# Patient Record
Sex: MALE | Race: WHITE | Employment: FULL TIME | URBAN - METROPOLITAN AREA
[De-identification: names, ages, dates, MRNs, and addresses within clinical notes are randomized per-mention and may not be internally consistent; named-entity substitution may affect disease eponyms.]

---

## 2017-04-04 ENCOUNTER — OFFICE VISIT (OUTPATIENT)
Dept: INTERNAL MEDICINE CLINIC | Facility: CLINIC | Age: 42
End: 2017-04-04

## 2017-04-04 VITALS
DIASTOLIC BLOOD PRESSURE: 86 MMHG | SYSTOLIC BLOOD PRESSURE: 126 MMHG | OXYGEN SATURATION: 98 % | HEART RATE: 83 BPM | HEIGHT: 67.75 IN | BODY MASS INDEX: 25.82 KG/M2 | WEIGHT: 168.38 LBS | TEMPERATURE: 98 F

## 2017-04-04 DIAGNOSIS — Z00.00 ROUTINE HEALTH MAINTENANCE: ICD-10-CM

## 2017-04-04 DIAGNOSIS — E78.1 HYPERGLYCERIDEMIA: Primary | ICD-10-CM

## 2017-04-04 DIAGNOSIS — E55.9 VITAMIN D DEFICIENCY: ICD-10-CM

## 2017-04-04 PROCEDURE — 99395 PREV VISIT EST AGE 18-39: CPT | Performed by: INTERNAL MEDICINE

## 2017-04-04 NOTE — PROGRESS NOTES
Maralee Moritz is a 46year old malePatient presents with:  Physical: Feeling well - \"pushing 40\" concerned about wt gain. HPI:     Maralee Moritz is a 46year old male who presents for a complete physical exam.     Feels well.   Exercising reg developed, well nourished, in no apparent distress  HEENT: normal oropharynx, normal TM's  EYES: PERRLA, EOMI, conjunctivae pink  NECK: supple, no cervical or supraclavicular lymphadenopathy, no carotid bruits  LUNGS: clear to auscultation  CARDIO: RRR, no

## 2017-07-01 ENCOUNTER — APPOINTMENT (OUTPATIENT)
Dept: LAB | Age: 42
End: 2017-07-01
Attending: INTERNAL MEDICINE
Payer: COMMERCIAL

## 2017-07-01 PROCEDURE — 84443 ASSAY THYROID STIM HORMONE: CPT | Performed by: INTERNAL MEDICINE

## 2017-07-01 PROCEDURE — 85025 COMPLETE CBC W/AUTO DIFF WBC: CPT | Performed by: INTERNAL MEDICINE

## 2017-07-01 PROCEDURE — 80061 LIPID PANEL: CPT | Performed by: INTERNAL MEDICINE

## 2017-07-01 PROCEDURE — 84460 ALANINE AMINO (ALT) (SGPT): CPT | Performed by: INTERNAL MEDICINE

## 2017-07-01 PROCEDURE — 84450 TRANSFERASE (AST) (SGOT): CPT | Performed by: INTERNAL MEDICINE

## 2017-07-01 PROCEDURE — 80048 BASIC METABOLIC PNL TOTAL CA: CPT | Performed by: INTERNAL MEDICINE

## 2017-07-01 PROCEDURE — 82306 VITAMIN D 25 HYDROXY: CPT | Performed by: INTERNAL MEDICINE

## 2017-07-18 ENCOUNTER — TELEPHONE (OUTPATIENT)
Dept: INTERNAL MEDICINE CLINIC | Facility: CLINIC | Age: 42
End: 2017-07-18

## 2017-07-19 NOTE — TELEPHONE ENCOUNTER
Please call pt with labs -- his triglycerides are still quite high -- he should be taking fish oil 3g/day and following a healthy diet. His Vitamin D level is still quite low (14). Is he still taking the weekly vitamin D? If not, he should.

## 2017-07-25 NOTE — TELEPHONE ENCOUNTER
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

## 2017-10-03 RX ORDER — ERGOCALCIFEROL 1.25 MG/1
CAPSULE ORAL
Qty: 13 CAPSULE | Refills: 3 | Status: SHIPPED | OUTPATIENT
Start: 2017-10-03 | End: 2018-09-04

## 2018-05-30 ENCOUNTER — OFFICE VISIT (OUTPATIENT)
Dept: INTERNAL MEDICINE CLINIC | Facility: CLINIC | Age: 43
End: 2018-05-30

## 2018-05-30 VITALS
HEART RATE: 78 BPM | TEMPERATURE: 98 F | DIASTOLIC BLOOD PRESSURE: 75 MMHG | SYSTOLIC BLOOD PRESSURE: 119 MMHG | BODY MASS INDEX: 25.74 KG/M2 | OXYGEN SATURATION: 98 % | HEIGHT: 68.5 IN | WEIGHT: 171.81 LBS

## 2018-05-30 DIAGNOSIS — R53.83 OTHER FATIGUE: ICD-10-CM

## 2018-05-30 DIAGNOSIS — E78.1 HYPERGLYCERIDEMIA: ICD-10-CM

## 2018-05-30 DIAGNOSIS — Z12.5 PROSTATE CANCER SCREENING: ICD-10-CM

## 2018-05-30 DIAGNOSIS — E55.9 VITAMIN D DEFICIENCY: ICD-10-CM

## 2018-05-30 DIAGNOSIS — Z00.00 ROUTINE HEALTH MAINTENANCE: Primary | ICD-10-CM

## 2018-05-30 PROCEDURE — 99396 PREV VISIT EST AGE 40-64: CPT | Performed by: INTERNAL MEDICINE

## 2018-05-30 NOTE — PROGRESS NOTES
Brayden Barnes is a 38year old malePatient presents with:  Physical    HPI:     Brayden Barnes is a 38year old male who presents for a complete physical exam.     Feels well. No formal exercise, but stays active. Son is 6, starting first grade. BMI 25.74 kg/m²   GENERAL: well developed, well nourished, in no apparent distress  HEENT: normal oropharynx, normal TM's  EYES: PERRLA, EOMI, conjunctivae pink  NECK: supple, no cervical or supraclavicular lymphadenopathy, no carotid bruits  LUNGS: clear

## 2018-08-27 LAB
ABSOLUTE BASOPHILS: 52 CELLS/UL (ref 0–200)
ABSOLUTE EOSINOPHILS: 104 CELLS/UL (ref 15–500)
ABSOLUTE LYMPHOCYTES: 2242 CELLS/UL (ref 850–3900)
ABSOLUTE MONOCYTES: 451 CELLS/UL (ref 200–950)
ABSOLUTE NEUTROPHILS: 4551 CELLS/UL (ref 1500–7800)
ALBUMIN/GLOBULIN RATIO: 1.9 (CALC) (ref 1–2.5)
ALBUMIN: 4.6 G/DL (ref 3.6–5.1)
ALKALINE PHOSPHATASE: 62 U/L (ref 40–115)
ALT: 25 U/L (ref 9–46)
AST: 18 U/L (ref 10–40)
BASOPHILS: 0.7 %
BILIRUBIN, TOTAL: 1 MG/DL (ref 0.2–1.2)
BUN: 10 MG/DL (ref 7–25)
CALCIUM: 9.6 MG/DL (ref 8.6–10.3)
CARBON DIOXIDE: 29 MMOL/L (ref 20–32)
CHLORIDE: 104 MMOL/L (ref 98–110)
CHOL/HDLC RATIO: 5 (CALC)
CHOLESTEROL, TOTAL: 150 MG/DL
CREATININE: 0.77 MG/DL (ref 0.6–1.35)
EGFR IF AFRICN AM: 131 ML/MIN/1.73M2
EGFR IF NONAFRICN AM: 113 ML/MIN/1.73M2
EOSINOPHILS: 1.4 %
GLOBULIN: 2.4 G/DL (CALC) (ref 1.9–3.7)
GLUCOSE: 94 MG/DL (ref 65–99)
HDL CHOLESTEROL: 30 MG/DL
HEMATOCRIT: 46.7 % (ref 38.5–50)
HEMOGLOBIN: 16 G/DL (ref 13.2–17.1)
LDL-CHOLESTEROL: 82 MG/DL (CALC)
LYMPHOCYTES: 30.3 %
MCH: 31.1 PG (ref 27–33)
MCHC: 34.3 G/DL (ref 32–36)
MCV: 90.7 FL (ref 80–100)
MONOCYTES: 6.1 %
MPV: 10.8 FL (ref 7.5–12.5)
NEUTROPHILS: 61.5 %
NON-HDL CHOLESTEROL: 120 MG/DL (CALC)
PLATELET COUNT: 150 THOUSAND/UL (ref 140–400)
POTASSIUM: 4.1 MMOL/L (ref 3.5–5.3)
PROTEIN, TOTAL: 7 G/DL (ref 6.1–8.1)
PSA, TOTAL: 0.6 NG/ML
RDW: 12.8 % (ref 11–15)
RED BLOOD CELL COUNT: 5.15 MILLION/UL (ref 4.2–5.8)
SODIUM: 139 MMOL/L (ref 135–146)
TRIGLYCERIDES: 292 MG/DL
TSH: 2.51 MIU/L (ref 0.4–4.5)
VITAMIN D, 25-OH, TOTAL: 13 NG/ML (ref 30–100)
WHITE BLOOD CELL COUNT: 7.4 THOUSAND/UL (ref 3.8–10.8)

## 2018-09-04 RX ORDER — ERGOCALCIFEROL 1.25 MG/1
CAPSULE ORAL
Qty: 13 CAPSULE | Refills: 3 | Status: SHIPPED | OUTPATIENT
Start: 2018-09-04 | End: 2019-12-10

## 2018-12-13 ENCOUNTER — OFFICE VISIT (OUTPATIENT)
Dept: OTOLARYNGOLOGY | Facility: CLINIC | Age: 43
End: 2018-12-13
Payer: COMMERCIAL

## 2018-12-13 VITALS
WEIGHT: 165 LBS | SYSTOLIC BLOOD PRESSURE: 118 MMHG | TEMPERATURE: 98 F | BODY MASS INDEX: 25.01 KG/M2 | DIASTOLIC BLOOD PRESSURE: 80 MMHG | HEIGHT: 68 IN

## 2018-12-13 DIAGNOSIS — R04.0 NOSEBLEED: Primary | ICD-10-CM

## 2018-12-13 PROCEDURE — 30901 CONTROL OF NOSEBLEED: CPT | Performed by: OTOLARYNGOLOGY

## 2018-12-13 PROCEDURE — 99212 OFFICE O/P EST SF 10 MIN: CPT | Performed by: OTOLARYNGOLOGY

## 2018-12-13 PROCEDURE — 99214 OFFICE O/P EST MOD 30 MIN: CPT | Performed by: OTOLARYNGOLOGY

## 2018-12-13 NOTE — PROGRESS NOTES
Maximiliano Freitas is a 41year old male. Patient presents with:  Epistaxis: nose bleed of both nostrils started saturday      HISTORY OF PRESENT ILLNESS  Lifelong history of chronic severe nosebleeds bilaterally.  I saw him a little more than a month ago Alcohol/week: 0.0 oz        Comment: 3-6 drinks/year      Drug use: No    Other Topics      Concerns:        Caffeine Concern: No          32 oz of soda      Family History   Problem Relation Age of Onset   • Gastro-Intestinal Disorder Mother         chron Normal. Oropharynx - Normal.   Ears Normal Inspection - Right: Normal, Left: Normal. Canal - Right: Normal, Left: Normal. TM - Right: Normal, Left: Normal.   Skin Normal Inspection - Normal.        Lymph Detail Normal Submental. Submandibular.  Anterior cer

## 2019-06-18 ENCOUNTER — APPOINTMENT (OUTPATIENT)
Dept: OTHER | Facility: HOSPITAL | Age: 44
End: 2019-06-18
Attending: ORTHOPAEDIC SURGERY

## 2019-09-27 ENCOUNTER — HOSPITAL ENCOUNTER (EMERGENCY)
Facility: HOSPITAL | Age: 44
Discharge: HOME | End: 2019-09-28
Attending: EMERGENCY MEDICINE
Payer: COMMERCIAL

## 2019-09-27 VITALS
HEART RATE: 82 BPM | BODY MASS INDEX: 25.01 KG/M2 | TEMPERATURE: 98 F | RESPIRATION RATE: 18 BRPM | SYSTOLIC BLOOD PRESSURE: 144 MMHG | OXYGEN SATURATION: 96 % | DIASTOLIC BLOOD PRESSURE: 80 MMHG | WEIGHT: 165 LBS | HEIGHT: 68 IN

## 2019-09-27 DIAGNOSIS — R04.0 EPISTAXIS: Primary | ICD-10-CM

## 2019-09-27 PROCEDURE — 99283 EMERGENCY DEPT VISIT LOW MDM: CPT

## 2019-09-27 PROCEDURE — 30903 CONTROL OF NOSEBLEED: CPT

## 2019-09-27 RX ORDER — TRANEXAMIC ACID 100 MG/ML
5 INJECTION, SOLUTION INTRAVENOUS ONCE
Status: COMPLETED | OUTPATIENT
Start: 2019-09-27 | End: 2019-09-27

## 2019-09-28 NOTE — ED INITIAL ASSESSMENT (HPI)
Pt reports spontaneous R and L nare nosebleed x3 hours. Denies thinner use.  Also reports lightheadedness

## 2019-09-28 NOTE — ED PROVIDER NOTES
Patient Seen in: [facility] AND [facility] Emergency Department    History   Patient presents with:  Nose Bleed (nasopharyngeal)      HPI    Patient presents to the ED with  significant bleeding from his right nostril for the past 3 hours.   Occasional mild bleed well-developed and well-nourished. No distress. HENT:   Head: Normocephalic and atraumatic. Ongoing bleeding from the right nare. Unable to visualize source of bleeding. Eyes: Conjunctivae are normal. Right eye exhibits no discharge.  Left eye exhibi packing placed and bleeding resolved completely. Stable for discharge home with outpatient ENT follow-up. Procedure:  Epistaxis Management:  Bleeding noted from the Right nare. Clots cleared with  Blowing. Vasoconstrictor applied, Santosh-Synephrine.  Bleed

## 2019-10-03 ENCOUNTER — OFFICE VISIT (OUTPATIENT)
Dept: OTOLARYNGOLOGY | Facility: CLINIC | Age: 44
End: 2019-10-03
Payer: COMMERCIAL

## 2019-10-03 VITALS
WEIGHT: 165 LBS | HEIGHT: 68 IN | TEMPERATURE: 98 F | DIASTOLIC BLOOD PRESSURE: 76 MMHG | SYSTOLIC BLOOD PRESSURE: 115 MMHG | BODY MASS INDEX: 25.01 KG/M2

## 2019-10-03 DIAGNOSIS — R04.0 NOSEBLEED: Primary | ICD-10-CM

## 2019-10-03 PROCEDURE — 99214 OFFICE O/P EST MOD 30 MIN: CPT | Performed by: OTOLARYNGOLOGY

## 2019-10-03 NOTE — PROGRESS NOTES
Maximiliano Freitas is a 45year old male. Patient presents with:  Epistaxis: nose bleed of both nostrils      HISTORY OF PRESENT ILLNESS  Lifelong history of chronic severe nosebleeds bilaterally.  I saw him a little more than a month ago and we cauterized Number of children: Not on file      Years of education: Not on file      Highest education level: Not on file    Tobacco Use      Smoking status: Never Smoker      Smokeless tobacco: Never Used    Substance and Sexual Activity      Alcohol use:  Yes Neurological Normal Memory - Normal. Cranial nerves - Cranial nerves II through XII grossly intact.    Head/Face Normal Facial features - Normal. Eyebrows - Normal. Skull - Normal.        Nasopharynx Normal External nose - Normal. Lips/teeth/gums - Normal we will start him on Singulair loratadine for what appears to be some congestive issues from allergies. Kalin Valerio.  Yaima Brand MD    10/3/2019    3:55 PM

## 2019-10-22 ENCOUNTER — OFFICE VISIT (OUTPATIENT)
Dept: INTERNAL MEDICINE CLINIC | Facility: CLINIC | Age: 44
End: 2019-10-22
Payer: COMMERCIAL

## 2019-10-22 VITALS
BODY MASS INDEX: 25.25 KG/M2 | HEIGHT: 68 IN | TEMPERATURE: 98 F | SYSTOLIC BLOOD PRESSURE: 98 MMHG | HEART RATE: 69 BPM | WEIGHT: 166.63 LBS | DIASTOLIC BLOOD PRESSURE: 68 MMHG | OXYGEN SATURATION: 98 %

## 2019-10-22 DIAGNOSIS — E78.1 HYPERGLYCERIDEMIA: ICD-10-CM

## 2019-10-22 DIAGNOSIS — E55.9 VITAMIN D DEFICIENCY: ICD-10-CM

## 2019-10-22 DIAGNOSIS — R53.83 OTHER FATIGUE: ICD-10-CM

## 2019-10-22 DIAGNOSIS — Z00.00 ROUTINE HEALTH MAINTENANCE: Primary | ICD-10-CM

## 2019-10-22 DIAGNOSIS — Z12.5 PROSTATE CANCER SCREENING: ICD-10-CM

## 2019-10-22 PROCEDURE — 90471 IMMUNIZATION ADMIN: CPT | Performed by: INTERNAL MEDICINE

## 2019-10-22 PROCEDURE — 90686 IIV4 VACC NO PRSV 0.5 ML IM: CPT | Performed by: INTERNAL MEDICINE

## 2019-10-22 PROCEDURE — 99396 PREV VISIT EST AGE 40-64: CPT | Performed by: INTERNAL MEDICINE

## 2019-10-22 NOTE — PROGRESS NOTES
Inocencio Khan is a 45year old malePatient presents with:  Physical: Pt no longer taking Fish Oil or Vitamin D, lab orders pended per protocol.      HPI:     Inocencio Khan is a 45year old male who presents for a complete physical exam.     Aime Stacy 69   Temp 98.2 °F (36.8 °C) (Oral)   Ht 5' 8\" (1.727 m)   Wt 166 lb 9.6 oz (75.6 kg)   SpO2 98%   BMI 25.33 kg/m²   GENERAL: well developed, well nourished, in no apparent distress  HEENT: normal oropharynx, normal TM's  EYES: PERRLA, EOMI, conjunctivae p

## 2019-12-10 RX ORDER — MULTIVIT-MIN/IRON/FOLIC ACID/K 18-600-40
4000 CAPSULE ORAL DAILY
Qty: 1 CAPSULE | Refills: 0 | COMMUNITY
Start: 2019-12-10 | End: 2019-12-18

## 2019-12-20 RX ORDER — MULTIVIT-MIN/IRON/FOLIC ACID/K 18-600-40
4000 CAPSULE ORAL DAILY
Qty: 180 CAPSULE | Refills: 3 | Status: SHIPPED | OUTPATIENT
Start: 2019-12-20 | End: 2020-12-10

## 2020-10-22 ENCOUNTER — OFFICE VISIT (OUTPATIENT)
Dept: INTERNAL MEDICINE CLINIC | Facility: CLINIC | Age: 45
End: 2020-10-22
Payer: COMMERCIAL

## 2020-10-22 VITALS
BODY MASS INDEX: 25.25 KG/M2 | OXYGEN SATURATION: 99 % | DIASTOLIC BLOOD PRESSURE: 80 MMHG | SYSTOLIC BLOOD PRESSURE: 112 MMHG | WEIGHT: 166.63 LBS | TEMPERATURE: 99 F | HEART RATE: 78 BPM | HEIGHT: 68 IN

## 2020-10-22 DIAGNOSIS — E55.9 VITAMIN D DEFICIENCY: ICD-10-CM

## 2020-10-22 DIAGNOSIS — R53.83 OTHER FATIGUE: ICD-10-CM

## 2020-10-22 DIAGNOSIS — Z00.00 ROUTINE HEALTH MAINTENANCE: Primary | ICD-10-CM

## 2020-10-22 DIAGNOSIS — Z12.5 SCREENING FOR PROSTATE CANCER: ICD-10-CM

## 2020-10-22 PROCEDURE — 3008F BODY MASS INDEX DOCD: CPT | Performed by: INTERNAL MEDICINE

## 2020-10-22 PROCEDURE — 90471 IMMUNIZATION ADMIN: CPT | Performed by: INTERNAL MEDICINE

## 2020-10-22 PROCEDURE — 3074F SYST BP LT 130 MM HG: CPT | Performed by: INTERNAL MEDICINE

## 2020-10-22 PROCEDURE — 90686 IIV4 VACC NO PRSV 0.5 ML IM: CPT | Performed by: INTERNAL MEDICINE

## 2020-10-22 PROCEDURE — 99396 PREV VISIT EST AGE 40-64: CPT | Performed by: INTERNAL MEDICINE

## 2020-10-22 PROCEDURE — 3079F DIAST BP 80-89 MM HG: CPT | Performed by: INTERNAL MEDICINE

## 2020-10-22 RX ORDER — AMOXICILLIN 500 MG
3600 CAPSULE ORAL DAILY
Qty: 1 CAPSULE | Refills: 0 | COMMUNITY
Start: 2020-10-22

## 2020-10-22 NOTE — PROGRESS NOTES
Tad Reich is a 39year old malePatient presents with:  Physical    HPI:     Tad Reich is a 39year old male who presents for a complete physical exam.     Still working in [email or web address] ; moved to a new house in [place].   Son (Oral)   Ht 5' 8\" (1.727 m)   Wt 166 lb 9.6 oz (75.6 kg)   SpO2 99%   BMI 25.33 kg/m²   GENERAL: well developed, well nourished, in no apparent distress  HEENT: normal oropharynx, normal TM's  EYES: PERRLA, EOMI, conjunctivae pink  NECK: supple, no cervic

## 2020-12-08 ENCOUNTER — HOSPITAL ENCOUNTER (OUTPATIENT)
Age: 45
Discharge: HOME | End: 2020-12-08
Payer: COMMERCIAL

## 2020-12-08 VITALS
BODY MASS INDEX: 25 KG/M2 | DIASTOLIC BLOOD PRESSURE: 82 MMHG | OXYGEN SATURATION: 96 % | WEIGHT: 165 LBS | RESPIRATION RATE: 16 BRPM | HEART RATE: 94 BPM | SYSTOLIC BLOOD PRESSURE: 151 MMHG | TEMPERATURE: 98 F

## 2020-12-08 DIAGNOSIS — J02.0 STREP PHARYNGITIS: Primary | ICD-10-CM

## 2020-12-08 DIAGNOSIS — Z20.822 ENCOUNTER FOR LABORATORY TESTING FOR COVID-19 VIRUS: ICD-10-CM

## 2020-12-08 PROCEDURE — 87430 STREP A AG IA: CPT

## 2020-12-08 PROCEDURE — 99213 OFFICE O/P EST LOW 20 MIN: CPT

## 2020-12-08 PROCEDURE — 99214 OFFICE O/P EST MOD 30 MIN: CPT

## 2020-12-08 RX ORDER — AMOXICILLIN 500 MG/1
500 TABLET, FILM COATED ORAL 2 TIMES DAILY
Qty: 14 TABLET | Refills: 0 | Status: SHIPPED | OUTPATIENT
Start: 2020-12-08 | End: 2020-12-15

## 2020-12-08 NOTE — ED PROVIDER NOTES
Patient Seen in: [facility]      History   Patient presents with:  Sore Throat    Stated Complaint: Slight sore throat, cough, no exposure    HPI    40 yo male here for evaluation of sore throat. Pt states symptoms started <24 hrs ago.   He r Neck:      Musculoskeletal: Normal range of motion. Cardiovascular:      Rate and Rhythm: Normal rate. Pulmonary:      Effort: Pulmonary effort is normal.   Abdominal:      General: Abdomen is flat. Musculoskeletal: Normal range of motion.    Skin:

## 2020-12-09 ENCOUNTER — TELEPHONE (OUTPATIENT)
Dept: INTERNAL MEDICINE CLINIC | Facility: CLINIC | Age: 45
End: 2020-12-09

## 2020-12-09 NOTE — TELEPHONE ENCOUNTER
To Dr. Acosta Boswell----    Ok to advise patient to await COVID results prior to return to work instructions?

## 2020-12-09 NOTE — TELEPHONE ENCOUNTER
Patient diagnosed w/strep at [facility] think its covid - but he was tested and results are pending  Requests call back to advise what steps he needs to do for going back to work    Call back # [phone]

## 2021-01-23 ENCOUNTER — PATIENT MESSAGE (OUTPATIENT)
Dept: INTERNAL MEDICINE CLINIC | Facility: CLINIC | Age: 46
End: 2021-01-23

## 2021-01-26 NOTE — TELEPHONE ENCOUNTER
From: Canelo Hutson  To: Yue Scott MD  Sent: 1/23/2021 2:25 PM CST  Subject: Non-Urgent Medical Question    Hello,    I don't think I've ever had my testosterone levels checked. I've looked through my charts, and don't see any results.  How can

## 2021-11-18 ENCOUNTER — OFFICE VISIT (OUTPATIENT)
Dept: INTERNAL MEDICINE CLINIC | Facility: CLINIC | Age: 46
End: 2021-11-18
Payer: COMMERCIAL

## 2021-11-18 VITALS
OXYGEN SATURATION: 98 % | WEIGHT: 168 LBS | HEIGHT: 68 IN | TEMPERATURE: 97 F | SYSTOLIC BLOOD PRESSURE: 118 MMHG | HEART RATE: 83 BPM | DIASTOLIC BLOOD PRESSURE: 74 MMHG | BODY MASS INDEX: 25.46 KG/M2

## 2021-11-18 DIAGNOSIS — E55.9 VITAMIN D DEFICIENCY: ICD-10-CM

## 2021-11-18 DIAGNOSIS — Z12.5 SCREENING FOR PROSTATE CANCER: ICD-10-CM

## 2021-11-18 DIAGNOSIS — Z00.00 ROUTINE HEALTH MAINTENANCE: ICD-10-CM

## 2021-11-18 DIAGNOSIS — Z23 NEED FOR VACCINATION: Primary | ICD-10-CM

## 2021-11-18 PROCEDURE — 90471 IMMUNIZATION ADMIN: CPT | Performed by: INTERNAL MEDICINE

## 2021-11-18 PROCEDURE — 3074F SYST BP LT 130 MM HG: CPT | Performed by: INTERNAL MEDICINE

## 2021-11-18 PROCEDURE — 99396 PREV VISIT EST AGE 40-64: CPT | Performed by: INTERNAL MEDICINE

## 2021-11-18 PROCEDURE — 90686 IIV4 VACC NO PRSV 0.5 ML IM: CPT | Performed by: INTERNAL MEDICINE

## 2021-11-18 PROCEDURE — 3008F BODY MASS INDEX DOCD: CPT | Performed by: INTERNAL MEDICINE

## 2021-11-18 PROCEDURE — 3078F DIAST BP <80 MM HG: CPT | Performed by: INTERNAL MEDICINE

## 2021-11-18 NOTE — PROGRESS NOTES
Nancy Walker is a 42year old malePatient presents with:  Physical: pt here for annual exam     HPI:     Nancy Walker is a 42year old male who presents for a complete physical exam.     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

## 2022-05-27 ENCOUNTER — HOSPITAL ENCOUNTER (OUTPATIENT)
Age: 47
Discharge: HOME | End: 2022-05-27
Attending: EMERGENCY MEDICINE
Payer: COMMERCIAL

## 2022-05-27 VITALS
HEART RATE: 85 BPM | TEMPERATURE: 97 F | DIASTOLIC BLOOD PRESSURE: 72 MMHG | RESPIRATION RATE: 20 BRPM | OXYGEN SATURATION: 97 % | SYSTOLIC BLOOD PRESSURE: 135 MMHG

## 2022-05-27 DIAGNOSIS — J02.9 PHARYNGITIS, UNSPECIFIED ETIOLOGY: Primary | ICD-10-CM

## 2022-05-27 LAB — S PYO AG THROAT QL: NEGATIVE

## 2022-05-27 PROCEDURE — 99213 OFFICE O/P EST LOW 20 MIN: CPT

## 2022-05-27 PROCEDURE — 99212 OFFICE O/P EST SF 10 MIN: CPT

## 2022-05-27 PROCEDURE — 87880 STREP A ASSAY W/OPTIC: CPT

## 2022-12-01 ENCOUNTER — OFFICE VISIT (OUTPATIENT)
Dept: INTERNAL MEDICINE CLINIC | Facility: CLINIC | Age: 47
End: 2022-12-01
Payer: COMMERCIAL

## 2022-12-01 VITALS
SYSTOLIC BLOOD PRESSURE: 108 MMHG | TEMPERATURE: 99 F | DIASTOLIC BLOOD PRESSURE: 68 MMHG | WEIGHT: 164 LBS | HEART RATE: 76 BPM | HEIGHT: 68 IN | BODY MASS INDEX: 24.86 KG/M2 | OXYGEN SATURATION: 98 %

## 2022-12-01 DIAGNOSIS — Z00.00 ROUTINE HEALTH MAINTENANCE: Primary | ICD-10-CM

## 2022-12-01 DIAGNOSIS — Z12.5 SCREENING FOR PROSTATE CANCER: ICD-10-CM

## 2022-12-01 DIAGNOSIS — E78.1 HYPERTRIGLYCERIDEMIA: ICD-10-CM

## 2022-12-01 DIAGNOSIS — E55.9 VITAMIN D DEFICIENCY: ICD-10-CM

## 2022-12-01 PROCEDURE — 3008F BODY MASS INDEX DOCD: CPT | Performed by: INTERNAL MEDICINE

## 2022-12-01 PROCEDURE — 99396 PREV VISIT EST AGE 40-64: CPT | Performed by: INTERNAL MEDICINE

## 2022-12-01 PROCEDURE — 3074F SYST BP LT 130 MM HG: CPT | Performed by: INTERNAL MEDICINE

## 2022-12-01 PROCEDURE — 3078F DIAST BP <80 MM HG: CPT | Performed by: INTERNAL MEDICINE

## 2022-12-18 LAB
ABSOLUTE BASOPHILS: 30 CELLS/UL (ref 0–200)
ABSOLUTE EOSINOPHILS: 78 CELLS/UL (ref 15–500)
ABSOLUTE LYMPHOCYTES: 1824 CELLS/UL (ref 850–3900)
ABSOLUTE MONOCYTES: 432 CELLS/UL (ref 200–950)
ABSOLUTE NEUTROPHILS: 3636 CELLS/UL (ref 1500–7800)
ALBUMIN/GLOBULIN RATIO: 2.3 (CALC) (ref 1–2.5)
ALBUMIN: 4.5 G/DL (ref 3.6–5.1)
ALKALINE PHOSPHATASE: 46 U/L (ref 36–130)
ALT: 15 U/L (ref 9–46)
AST: 14 U/L (ref 10–40)
BASOPHILS: 0.5 %
BILIRUBIN, TOTAL: 1.2 MG/DL (ref 0.2–1.2)
BUN: 12 MG/DL (ref 7–25)
CALCIUM: 9.2 MG/DL (ref 8.6–10.3)
CARBON DIOXIDE: 30 MMOL/L (ref 20–32)
CHLORIDE: 105 MMOL/L (ref 98–110)
CHOL/HDLC RATIO: 3.7 (CALC)
CHOLESTEROL, TOTAL: 133 MG/DL
CREATININE: 0.76 MG/DL (ref 0.6–1.29)
EGFR: 113 ML/MIN/1.73M2
EOSINOPHILS: 1.3 %
GLOBULIN: 2 G/DL (CALC) (ref 1.9–3.7)
GLUCOSE: 94 MG/DL (ref 65–99)
HDL CHOLESTEROL: 36 MG/DL
HEMATOCRIT: 46.2 % (ref 38.5–50)
HEMOGLOBIN: 15.8 G/DL (ref 13.2–17.1)
LDL-CHOLESTEROL: 78 MG/DL (CALC)
LYMPHOCYTES: 30.4 %
MCH: 31.6 PG (ref 27–33)
MCHC: 34.2 G/DL (ref 32–36)
MCV: 92.4 FL (ref 80–100)
MONOCYTES: 7.2 %
MPV: 10.6 FL (ref 7.5–12.5)
NEUTROPHILS: 60.6 %
NON-HDL CHOLESTEROL: 97 MG/DL (CALC)
PLATELET COUNT: 155 THOUSAND/UL (ref 140–400)
POTASSIUM: 4.1 MMOL/L (ref 3.5–5.3)
PROTEIN, TOTAL: 6.5 G/DL (ref 6.1–8.1)
PSA, TOTAL: 0.79 NG/ML
RDW: 12.5 % (ref 11–15)
RED BLOOD CELL COUNT: 5 MILLION/UL (ref 4.2–5.8)
SODIUM: 140 MMOL/L (ref 135–146)
TRIGLYCERIDES: 103 MG/DL
TSH: 1.36 MIU/L (ref 0.4–4.5)
VITAMIN D, 25-OH, TOTAL: 50 NG/ML (ref 30–100)
WHITE BLOOD CELL COUNT: 6 THOUSAND/UL (ref 3.8–10.8)

## 2024-01-16 ENCOUNTER — OFFICE VISIT (OUTPATIENT)
Dept: OTOLARYNGOLOGY | Facility: CLINIC | Age: 49
End: 2024-01-16
Payer: COMMERCIAL

## 2024-01-16 VITALS — BODY MASS INDEX: 24 KG/M2 | WEIGHT: 160 LBS

## 2024-01-16 DIAGNOSIS — R04.0 NOSEBLEED: Primary | ICD-10-CM

## 2024-01-16 PROCEDURE — 99203 OFFICE O/P NEW LOW 30 MIN: CPT | Performed by: OTOLARYNGOLOGY

## 2024-01-16 PROCEDURE — 30901 CONTROL OF NOSEBLEED: CPT | Performed by: OTOLARYNGOLOGY

## 2024-01-16 RX ORDER — CEPHALEXIN 500 MG/1
500 CAPSULE ORAL EVERY 8 HOURS
Qty: 21 CAPSULE | Refills: 0 | Status: SHIPPED | OUTPATIENT
Start: 2024-01-16

## 2024-01-16 NOTE — PROGRESS NOTES
Georgi Mai is a 48 year old male.    Chief Complaint   Patient presents with    Nose Problem     Nosebleeds, last episode was 01/13/24       HISTORY OF PRESENT ILLNESS  Lifelong history of chronic severe nosebleeds bilaterally. I saw him a little more than a month ago and we cauterized the vessels on the right. Since then he's only had a few episodes of spotting and no further bleeding from the right. He has been using saline gels, Vaseline and a humidifier with overall improvement in his bleeding on the left as well. He did have a pretty severe nosebleed last night at about an hour to stop which she controlled with pinching and use of a paper towel. No known personal or family history of eating disorder. She is here to discuss cauterization of his left-sided vessels as well.   8/25/16 Last seen in 2014 for epistaxis. Vessels cauterized bilaterally and no bleeding for over one year. Now with brisk bleeds several times a day for the past few months. No recent nasal trauma. No other new signs or symptoms.      12/8/16 Back in 2014 when we cauterized both sides he did well for almost 2 years. Since I cauterized his right vessels in August he's had no further right-sided bleeding but does continue to have left-sided bleeding frequently. He's had at least 5 episodes since September which often will last up to half an hour. No family or personal history of bleeding disorder. No other signs, symptoms or complaints.     12/13/18 he did well up until Saturday when he started having some nosebleeds primarily from the right side.  He is here to have his vessels cauterized.   10/3/19 doing well up until a week or 2 ago when he started having some nosebleeds.  Friday night severe nosebleed on the right and up in the ER had a pack placed which she removed on Sunday.  Now here for further evaluation and treatment.  No underlying issues for bleeding.     1/16/24 Saw him in 2019.  He did well up until a few months ago when  he started having bilateral nosebleeds right greater than left.  He is not sure if this is in any way related to the use of forced heat in the household.  A few years ago had a central humidifier placed but he lives in a 3 level home and is not sure if his bedroom is sufficiently humidified.  Does not have a humidifier in the room.  No other new signs, symptoms or complaints.    Social History     Socioeconomic History    Marital status:    Tobacco Use    Smoking status: Never    Smokeless tobacco: Never   Substance and Sexual Activity    Alcohol use: Yes     Comment: 3-6 drinks/year    Drug use: No   Other Topics Concern    Caffeine Concern No     Comment: 32 oz of soda       Family History   Problem Relation Age of Onset    Gastro-Intestinal Disorder Mother         chrons disease    Cancer Paternal Grandmother     Diabetes Paternal Grandfather        Past Medical History:   Diagnosis Date    Hypertriglyceridemia        Past Surgical History:   Procedure Laterality Date    OTHER SURGICAL HISTORY  11/2014    Nasal Cauterization         REVIEW OF SYSTEMS    System Neg/Pos Details   Constitutional Negative Fatigue, fever and weight loss.   ENMT Negative Drooling.   Eyes Negative Blurred vision and vision changes.   Respiratory Negative Dyspnea and wheezing.   Cardio Negative Chest pain, irregular heartbeat/palpitations and syncope.   GI Negative Abdominal pain and diarrhea.   Endocrine Negative Cold intolerance and heat intolerance.   Neuro Negative Tremors.   Psych Negative Anxiety and depression.   Integumentary Negative Frequent skin infections, pigment change and rash.   Hema/Lymph Negative Easy bleeding and easy bruising.           PHYSICAL EXAM    Wt 160 lb (72.6 kg)   BMI 24.33 kg/m²        Constitutional Normal Overall appearance - Normal.   Psychiatric Normal Orientation - Oriented to time, place, person & situation. Appropriate mood and affect.   Neck Exam Normal Inspection - Normal. Palpation -  Normal. Parotid gland - Normal. Thyroid gland - Normal.   Eyes Normal Conjunctiva - Right: Normal, Left: Normal. Pupil - Right: Normal, Left: Normal. Fundus - Right: Normal, Left: Normal.   Neurological Normal Memory - Normal. Cranial nerves - Cranial nerves II through XII grossly intact.   Head/Face Normal Facial features - Normal. Eyebrows - Normal. Skull - Normal.        Nasopharynx Normal External nose - Normal. Lips/teeth/gums - Normal. Tonsils - Normal. Oropharynx - Normal.   Ears Normal Inspection - Right: Normal, Left: Normal. Canal - Right: Normal, Left: Normal. TM - Right: Normal, Left: Normal.   Skin Normal Inspection - Normal.        Lymph Detail Normal Submental. Submandibular. Anterior cervical. Posterior cervical. Supraclavicular.        Nose/Mouth/Throat Normal External nose - Normal. Lips/teeth/gums - Normal. Tonsils - Normal. Oropharynx - Normal.   Nose/Mouth/Throat Normal Nares - Right: Normal Left: Normal. Septum -Normal  Turbinates - Right: Normal, Left: Normal.  Bilateral septal scabbing significant anterior nasal crusting bilaterally hypertrophic vessels bilaterally right greater than left   Procedures:  Control Epistaxis:  Pre-Procedure Care: Consent was obtained. Procedure/Risks were explained. Questions were answered. Correct patient identified. Correct side and site confirmed.   Control of a simple anterior nosebleed was performed. Location of the bleed was Kiesselbach's plexus. The procedure was performed on the right side.  Bleeding site/vessels were treated with AgNO3 cauterization.  Anesthesia used was topical Lidocaine/epinephrine 4%/1:71604   Patient tolerated the procedure well. Discharge instructions were discussed with the patient/parent. Epistaxis precautions were explained and understood. Use Vaseline and/or nasal saline gel to the affected area TID.       Current Outpatient Medications:     Omega-3 Fatty Acids (FISH OIL) 1200 MG Oral Cap, Take 3,600 mg by mouth daily., Disp: 1  capsule, Rfl: 0  ASSESSMENT AND PLAN    1. Nosebleed  Right septal vessels cauterized with silver nitrate.  Start mupirocin and cephalexin for a slight infection noted of the alar skin and for the significant crusting noted.  Epistaxis precautions discussed and use of Vaseline after he finishes with mupirocin return to see me in 1 month for reevaluation and we may address the left vessels if he continues to have bleeding on that side.        This note was prepared using Dragon Medical voice recognition dictation software. As a result errors may occur. When identified these errors have been corrected. While every attempt is made to correct errors during dictation discrepancies may still exist    Tyrone Kuo MD    1/16/2024    7:43 AM

## 2024-02-15 ENCOUNTER — TELEPHONE (OUTPATIENT)
Dept: INTERNAL MEDICINE CLINIC | Facility: CLINIC | Age: 49
End: 2024-02-15

## 2024-02-15 ENCOUNTER — OFFICE VISIT (OUTPATIENT)
Dept: INTERNAL MEDICINE CLINIC | Facility: CLINIC | Age: 49
End: 2024-02-15
Payer: COMMERCIAL

## 2024-02-15 VITALS
SYSTOLIC BLOOD PRESSURE: 110 MMHG | DIASTOLIC BLOOD PRESSURE: 78 MMHG | BODY MASS INDEX: 25.16 KG/M2 | OXYGEN SATURATION: 96 % | HEART RATE: 75 BPM | WEIGHT: 166 LBS | HEIGHT: 68 IN | TEMPERATURE: 98 F

## 2024-02-15 DIAGNOSIS — Z12.5 SCREENING FOR PROSTATE CANCER: ICD-10-CM

## 2024-02-15 DIAGNOSIS — H61.23 BILATERAL IMPACTED CERUMEN: ICD-10-CM

## 2024-02-15 DIAGNOSIS — Z00.00 ROUTINE HEALTH MAINTENANCE: Primary | ICD-10-CM

## 2024-02-15 DIAGNOSIS — E55.9 VITAMIN D DEFICIENCY: ICD-10-CM

## 2024-02-15 PROBLEM — E78.1 HYPERTRIGLYCERIDEMIA: Status: RESOLVED | Noted: 2022-12-01 | Resolved: 2024-02-15

## 2024-02-15 PROCEDURE — 90471 IMMUNIZATION ADMIN: CPT | Performed by: INTERNAL MEDICINE

## 2024-02-15 PROCEDURE — 3008F BODY MASS INDEX DOCD: CPT | Performed by: INTERNAL MEDICINE

## 2024-02-15 PROCEDURE — 90715 TDAP VACCINE 7 YRS/> IM: CPT | Performed by: INTERNAL MEDICINE

## 2024-02-15 PROCEDURE — 3074F SYST BP LT 130 MM HG: CPT | Performed by: INTERNAL MEDICINE

## 2024-02-15 PROCEDURE — 3078F DIAST BP <80 MM HG: CPT | Performed by: INTERNAL MEDICINE

## 2024-02-15 PROCEDURE — 99396 PREV VISIT EST AGE 40-64: CPT | Performed by: INTERNAL MEDICINE

## 2024-02-15 NOTE — PROGRESS NOTES
Georgi Mai is a 48 year old male  Chief Complaint   Patient presents with    Physical     Right knee \"cracking\", no other concerns      HPI:     Georgi Mai is a 48 year old male who presents for a complete physical exam.       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  Feels he is in the best shape of his life     Wt Readings from Last 6 Encounters:   02/15/24 166 lb (75.3 kg)   01/16/24 160 lb (72.6 kg)   12/01/22 164 lb (74.4 kg)   11/18/21 168 lb (76.2 kg)   12/08/20 165 lb (74.8 kg)   10/22/20 166 lb 9.6 oz (75.6 kg)     Body mass index is 25.24 kg/m².       Current Outpatient Medications   Medication Sig Dispense Refill    mupirocin 2 % External Ointment Apply 1 Application topically 3 (three) times daily. 1 each 0    Omega-3 Fatty Acids (FISH OIL) 1200 MG Oral Cap Take 3,600 mg by mouth daily. 1 capsule 0      Past Medical History:   Diagnosis Date    Hypertriglyceridemia       Past Surgical History:   Procedure Laterality Date    OTHER SURGICAL HISTORY  11/2014    Nasal Cauterization      Family History   Problem Relation Age of Onset    Gastro-Intestinal Disorder Mother         chrons disease    Cancer Paternal Grandmother     Diabetes Paternal Grandfather       Social History:  Social History     Socioeconomic History    Marital status:    Tobacco Use    Smoking status: Never    Smokeless tobacco: Never   Vaping Use    Vaping Use: Never used   Substance and Sexual Activity    Alcohol use: Not Currently     Comment: 3-6 drinks/year    Drug use: No   Other Topics Concern    Caffeine Concern No     Comment: 32 oz of soda           REVIEW OF SYSTEMS:   GENERAL: feels well otherwise  EYES:denies blurred vision or double vision  HEENT: denies nasal congestion, sinus pain or ST  LUNGS: denies shortness of breath or cough  CARDIOVASCULAR: denies chest pain or pressure or palpitations  GI: denies abdominal  pain, N/V, diarrhea, constipation, hematochezia or melena  : denies nocturia or changes in stream  NEURO: denies headaches or dizziness      EXAM:   /78   Pulse 75   Temp 97.5 °F (36.4 °C) (Tympanic)   Ht 5' 8\" (1.727 m)   Wt 166 lb (75.3 kg)   SpO2 96%   BMI 25.24 kg/m²   GENERAL: well developed, well nourished, in no apparent distress  HEENT: normal oropharynx, bilateral impacted cerumen   EYES: PERRLA, EOMI, conjunctivae pink  NECK: supple, no cervical or supraclavicular lymphadenopathy, no carotid bruits  LUNGS: clear to auscultation  CARDIO: RRR, normal S1S2, no gallops or murmurs  GI: soft, NT, ND, NABS, no HSM  EXTREMITIES: no edema, +2 PT pulses      ASSESSMENT AND PLAN:     Routine health maintenance  Cont exercise   Tdap today 2/15/24  Had covid vax  Did not schedule colonoscopy last year; agrees to Cologuard -- Rx signed and faxed  Had flu shot in 11/2023  Check labs (hx of hypertriglyceridemia, though normalized with diet/exercise)    Vitamin D deficiency  Takes 4000 units/day.  Check level.    Bilateral impacted cerumen, large amount  Easily removed with curette b/l  TM's normal         RTC 1 yr.    Rochelle Winkler MD, 02/15/24, 5:44 PM

## 2024-02-20 ENCOUNTER — OFFICE VISIT (OUTPATIENT)
Dept: OTOLARYNGOLOGY | Facility: CLINIC | Age: 49
End: 2024-02-20
Payer: COMMERCIAL

## 2024-02-20 VITALS — BODY MASS INDEX: 25.16 KG/M2 | HEIGHT: 68 IN | WEIGHT: 166 LBS

## 2024-02-20 DIAGNOSIS — R04.0 NOSEBLEED: Primary | ICD-10-CM

## 2024-02-20 PROCEDURE — 3008F BODY MASS INDEX DOCD: CPT | Performed by: OTOLARYNGOLOGY

## 2024-02-20 PROCEDURE — 99213 OFFICE O/P EST LOW 20 MIN: CPT | Performed by: OTOLARYNGOLOGY

## 2024-02-20 NOTE — PROGRESS NOTES
Georgi Mai is a 48 year old male.    Chief Complaint   Patient presents with    Follow - Up     F/up check on nose post cauterization  Pt reports having no nosebleeds since last visit       HISTORY OF PRESENT ILLNESS  Lifelong history of chronic severe nosebleeds bilaterally. I saw him a little more than a month ago and we cauterized the vessels on the right. Since then he's only had a few episodes of spotting and no further bleeding from the right. He has been using saline gels, Vaseline and a humidifier with overall improvement in his bleeding on the left as well. He did have a pretty severe nosebleed last night at about an hour to stop which she controlled with pinching and use of a paper towel. No known personal or family history of eating disorder. She is here to discuss cauterization of his left-sided vessels as well.   8/25/16 Last seen in 2014 for epistaxis. Vessels cauterized bilaterally and no bleeding for over one year. Now with brisk bleeds several times a day for the past few months. No recent nasal trauma. No other new signs or symptoms.      12/8/16 Back in 2014 when we cauterized both sides he did well for almost 2 years. Since I cauterized his right vessels in August he's had no further right-sided bleeding but does continue to have left-sided bleeding frequently. He's had at least 5 episodes since September which often will last up to half an hour. No family or personal history of bleeding disorder. No other signs, symptoms or complaints.     12/13/18 he did well up until Saturday when he started having some nosebleeds primarily from the right side.  He is here to have his vessels cauterized.   10/3/19 doing well up until a week or 2 ago when he started having some nosebleeds.  Friday night severe nosebleed on the right and up in the ER had a pack placed which she removed on Sunday.  Now here for further evaluation and treatment.  No underlying issues for bleeding.     1/16/24 Saw him in  2019.  He did well up until a few months ago when he started having bilateral nosebleeds right greater than left.  He is not sure if this is in any way related to the use of forced heat in the household.  A few years ago had a central humidifier placed but he lives in a 3 level home and is not sure if his bedroom is sufficiently humidified.  Does not have a humidifier in the room.  No other new signs, symptoms or complaints.     2/20/24 last visit I cauterized some vessels on the right anteriorly.  He had a what appeared to be a mild nasal vestibulitis and I did start him on cephalexin and mupirocin and he did note that that helped with the crusting.  He had a little bit of bleeding the first week as I did predicted to him and since then he has had no further bleeding and feels really good today.  This, symptoms or      Social History     Socioeconomic History    Marital status:    Tobacco Use    Smoking status: Never    Smokeless tobacco: Never   Vaping Use    Vaping Use: Never used   Substance and Sexual Activity    Alcohol use: Not Currently     Comment: 3-6 drinks/year    Drug use: No   Other Topics Concern    Caffeine Concern No     Comment: 32 oz of soda       Family History   Problem Relation Age of Onset    Gastro-Intestinal Disorder Mother         chrons disease    Cancer Paternal Grandmother     Diabetes Paternal Grandfather        Past Medical History:   Diagnosis Date    Hypertriglyceridemia        Past Surgical History:   Procedure Laterality Date    OTHER SURGICAL HISTORY  11/2014    Nasal Cauterization         REVIEW OF SYSTEMS    System Neg/Pos Details   Constitutional Negative Fatigue, fever and weight loss.   ENMT Negative Drooling.   Eyes Negative Blurred vision and vision changes.   Respiratory Negative Dyspnea and wheezing.   Cardio Negative Chest pain, irregular heartbeat/palpitations and syncope.   GI Negative Abdominal pain and diarrhea.   Endocrine Negative Cold intolerance and heat  intolerance.   Neuro Negative Tremors.   Psych Negative Anxiety and depression.   Integumentary Negative Frequent skin infections, pigment change and rash.   Hema/Lymph Negative Easy bleeding and easy bruising.           PHYSICAL EXAM    Ht 5' 8\" (1.727 m)   Wt 166 lb (75.3 kg)   BMI 25.24 kg/m²        Constitutional Normal Overall appearance - Normal.   Psychiatric Normal Orientation - Oriented to time, place, person & situation. Appropriate mood and affect.   Neck Exam Normal Inspection - Normal. Palpation - Normal. Parotid gland - Normal. Thyroid gland - Normal.   Eyes Normal Conjunctiva - Right: Normal, Left: Normal. Pupil - Right: Normal, Left: Normal. Fundus - Right: Normal, Left: Normal.   Neurological Normal Memory - Normal. Cranial nerves - Cranial nerves II through XII grossly intact.   Head/Face Normal Facial features - Normal. Eyebrows - Normal. Skull - Normal.        Nasopharynx Normal External nose - Normal. Lips/teeth/gums - Normal. Tonsils - Normal. Oropharynx - Normal.   Ears Normal Inspection - Right: Normal, Left: Normal. Canal - Right: Normal, Left: Normal. TM - Right: Normal, Left: Normal.   Skin Normal Inspection - Normal.        Lymph Detail Normal Submental. Submandibular. Anterior cervical. Posterior cervical. Supraclavicular.        Nose/Mouth/Throat Normal External nose - Normal. Lips/teeth/gums - Normal. Tonsils - Normal. Oropharynx - Normal.   Nose/Mouth/Throat Normal Nares - Right: Normal Left: Normal. Septum -Normal  Turbinates - Right: Normal, Left: Normal.       Current Outpatient Medications:     mupirocin 2 % External Ointment, Apply 1 Application topically 3 (three) times daily., Disp: 1 each, Rfl: 0    Omega-3 Fatty Acids (FISH OIL) 1200 MG Oral Cap, Take 3,600 mg by mouth daily., Disp: 1 capsule, Rfl: 0  ASSESSMENT AND PLAN    1. Nosebleed  Resolution of his nosebleeds since I last saw him.  Last visit he was cauterized on the right with silver nitrate.  Did well with the  cephalexin and mupirocin with significant improvement in his crusting bilaterally.  I did ask him to continue use of humidification and Vaseline several times a day to prevent recurrence of his nosebleeds and crusting.  Return to see me on a as needed basis.        This note was prepared using Dragon Medical voice recognition dictation software. As a result errors may occur. When identified these errors have been corrected. While every attempt is made to correct errors during dictation discrepancies may still exist    Tyrone Kuo MD    2/20/2024    8:07 AM

## 2024-03-16 ENCOUNTER — PATIENT MESSAGE (OUTPATIENT)
Dept: INTERNAL MEDICINE CLINIC | Facility: CLINIC | Age: 49
End: 2024-03-16

## 2024-03-18 NOTE — TELEPHONE ENCOUNTER
From: Georgi Mai  To: Rochelle Winkler  Sent: 3/16/2024 3:13 PM CDT  Subject: Lab work    Hello,    My lab work request was never sent to [facility] on [facility] in [place]. I went there today and they didn’t have the order. Can it be sent so I can reschedule?    Kalia Mai  [phone]

## 2024-03-18 NOTE — TELEPHONE ENCOUNTER
Lab orders faxed to [facility] at [phone]-confirmation recieved  [email or web address] message sent

## 2024-03-31 LAB
ABSOLUTE BASOPHILS: 32 CELLS/UL (ref 0–200)
ABSOLUTE EOSINOPHILS: 83 CELLS/UL (ref 15–500)
ABSOLUTE LYMPHOCYTES: 1901 CELLS/UL (ref 850–3900)
ABSOLUTE MONOCYTES: 582 CELLS/UL (ref 200–950)
ABSOLUTE NEUTROPHILS: 3802 CELLS/UL (ref 1500–7800)
ALBUMIN/GLOBULIN RATIO: 2.1 (CALC) (ref 1–2.5)
ALBUMIN: 4.5 G/DL (ref 3.6–5.1)
ALKALINE PHOSPHATASE: 50 U/L (ref 36–130)
ALT: 18 U/L (ref 9–46)
AST: 16 U/L (ref 10–40)
BASOPHILS: 0.5 %
BILIRUBIN, TOTAL: 1 MG/DL (ref 0.2–1.2)
BUN: 15 MG/DL (ref 7–25)
CALCIUM: 9.3 MG/DL (ref 8.6–10.3)
CARBON DIOXIDE: 28 MMOL/L (ref 20–32)
CHLORIDE: 106 MMOL/L (ref 98–110)
CHOL/HDLC RATIO: 3.5 (CALC)
CHOLESTEROL, TOTAL: 131 MG/DL
CREATININE: 0.62 MG/DL (ref 0.6–1.29)
EGFR: 119 ML/MIN/1.73M2
EOSINOPHILS: 1.3 %
GLOBULIN: 2.1 G/DL (CALC) (ref 1.9–3.7)
GLUCOSE: 96 MG/DL (ref 65–99)
HDL CHOLESTEROL: 37 MG/DL
HEMATOCRIT: 45.8 % (ref 38.5–50)
HEMOGLOBIN: 15.4 G/DL (ref 13.2–17.1)
LDL-CHOLESTEROL: 70 MG/DL (CALC)
LYMPHOCYTES: 29.7 %
MCH: 31.2 PG (ref 27–33)
MCHC: 33.6 G/DL (ref 32–36)
MCV: 92.7 FL (ref 80–100)
MONOCYTES: 9.1 %
MPV: 10.5 FL (ref 7.5–12.5)
NEUTROPHILS: 59.4 %
NON-HDL CHOLESTEROL: 94 MG/DL (CALC)
PLATELET COUNT: 153 THOUSAND/UL (ref 140–400)
POTASSIUM: 4.1 MMOL/L (ref 3.5–5.3)
PROTEIN, TOTAL: 6.6 G/DL (ref 6.1–8.1)
PSA, TOTAL: 0.65 NG/ML
RDW: 12.8 % (ref 11–15)
RED BLOOD CELL COUNT: 4.94 MILLION/UL (ref 4.2–5.8)
SODIUM: 141 MMOL/L (ref 135–146)
TRIGLYCERIDES: 158 MG/DL
TSH W/REFLEX TO FT4: 1.84 MIU/L (ref 0.4–4.5)
VITAMIN D, 25-OH, TOTAL: 47 NG/ML (ref 30–100)
WHITE BLOOD CELL COUNT: 6.4 THOUSAND/UL (ref 3.8–10.8)

## 2024-06-20 ENCOUNTER — OFFICE VISIT (OUTPATIENT)
Dept: OTOLARYNGOLOGY | Facility: CLINIC | Age: 49
End: 2024-06-20

## 2024-06-20 DIAGNOSIS — R04.0 NOSEBLEED: Primary | ICD-10-CM

## 2024-06-20 PROCEDURE — 99213 OFFICE O/P EST LOW 20 MIN: CPT | Performed by: OTOLARYNGOLOGY

## 2024-06-20 PROCEDURE — 30901 CONTROL OF NOSEBLEED: CPT | Performed by: OTOLARYNGOLOGY

## 2024-06-20 NOTE — PROGRESS NOTES
Georgi Mai is a 49 year old male.    Chief Complaint   Patient presents with    Epistaxis     Nosebleed on left side x1 week ago       HISTORY OF PRESENT ILLNESS  Lifelong history of chronic severe nosebleeds bilaterally. I saw him a little more than a month ago and we cauterized the vessels on the right. Since then he's only had a few episodes of spotting and no further bleeding from the right. He has been using saline gels, Vaseline and a humidifier with overall improvement in his bleeding on the left as well. He did have a pretty severe nosebleed last night at about an hour to stop which she controlled with pinching and use of a paper towel. No known personal or family history of eating disorder. She is here to discuss cauterization of his left-sided vessels as well.   8/25/16 Last seen in 2014 for epistaxis. Vessels cauterized bilaterally and no bleeding for over one year. Now with brisk bleeds several times a day for the past few months. No recent nasal trauma. No other new signs or symptoms.      12/8/16 Back in 2014 when we cauterized both sides he did well for almost 2 years. Since I cauterized his right vessels in August he's had no further right-sided bleeding but does continue to have left-sided bleeding frequently. He's had at least 5 episodes since September which often will last up to half an hour. No family or personal history of bleeding disorder. No other signs, symptoms or complaints.     12/13/18 he did well up until Saturday when he started having some nosebleeds primarily from the right side.  He is here to have his vessels cauterized.   10/3/19 doing well up until a week or 2 ago when he started having some nosebleeds.  Friday night severe nosebleed on the right and up in the ER had a pack placed which she removed on Sunday.  Now here for further evaluation and treatment.  No underlying issues for bleeding.     1/16/24 Saw him in 2019.  He did well up until a few months ago when he  started having bilateral nosebleeds right greater than left.  He is not sure if this is in any way related to the use of forced heat in the household.  A few years ago had a central humidifier placed but he lives in a 3 level home and is not sure if his bedroom is sufficiently humidified.  Does not have a humidifier in the room.  No other new signs, symptoms or complaints.     2/20/24 last visit I cauterized some vessels on the right anteriorly.  He had a what appeared to be a mild nasal vestibulitis and I did start him on cephalexin and mupirocin and he did note that that helped with the crusting.  He had a little bit of bleeding the first week as I did predicted to him and since then he has had no further bleeding and feels really good today.  This, symptoms or      6/20/24 no bleeding on the right which is the last side I cauterized about 4 months ago.  Now has had a few weeks of excessive bleeding from the left side.  He will try to play the nose but he will still note some blood in the back of his mouth and throat.  Has not been using any ointments recently.  No other new signs, symptoms or complaints      Social History     Socioeconomic History    Marital status:    Tobacco Use    Smoking status: Never    Smokeless tobacco: Never   Vaping Use    Vaping status: Never Used   Substance and Sexual Activity    Alcohol use: Not Currently     Comment: 3-6 drinks/year    Drug use: No   Other Topics Concern    Caffeine Concern No     Comment: 32 oz of soda       Family History   Problem Relation Age of Onset    Gastro-Intestinal Disorder Mother         chrons disease    Cancer Paternal Grandmother     Diabetes Paternal Grandfather        Past Medical History:    Hypertriglyceridemia       Past Surgical History:   Procedure Laterality Date    Other surgical history  11/2014    Nasal Cauterization         REVIEW OF SYSTEMS    System Neg/Pos Details   Constitutional Negative Fatigue, fever and weight loss.   ENMT  Negative Drooling.   Eyes Negative Blurred vision and vision changes.   Respiratory Negative Dyspnea and wheezing.   Cardio Negative Chest pain, irregular heartbeat/palpitations and syncope.   GI Negative Abdominal pain and diarrhea.   Endocrine Negative Cold intolerance and heat intolerance.   Neuro Negative Tremors.   Psych Negative Anxiety and depression.   Integumentary Negative Frequent skin infections, pigment change and rash.   Hema/Lymph Negative Easy bleeding and easy bruising.           PHYSICAL EXAM    There were no vitals taken for this visit.       Constitutional Normal Overall appearance - Normal.   Psychiatric Normal Orientation - Oriented to time, place, person & situation. Appropriate mood and affect.   Neck Exam Normal Inspection - Normal. Palpation - Normal. Parotid gland - Normal. Thyroid gland - Normal.   Eyes Normal Conjunctiva - Right: Normal, Left: Normal. Pupil - Right: Normal, Left: Normal. Fundus - Right: Normal, Left: Normal.   Neurological Normal Memory - Normal. Cranial nerves - Cranial nerves II through XII grossly intact.   Head/Face Normal Facial features - Normal. Eyebrows - Normal. Skull - Normal.        Nasopharynx Normal External nose - Normal. Lips/teeth/gums - Normal. Tonsils - Normal. Oropharynx - Normal.   Ears Normal Inspection - Right: Normal, Left: Normal. Canal - Right: Normal, Left: Normal. TM - Right: Normal, Left: Normal.   Skin Normal Inspection - Normal.        Lymph Detail Normal Submental. Submandibular. Anterior cervical. Posterior cervical. Supraclavicular.        Nose/Mouth/Throat Normal External nose - Normal. Lips/teeth/gums - Normal. Tonsils - Normal. Oropharynx - Normal.   Nose/Mouth/Throat Normal Nares - Right: Normal Left: Normal. Septum -Normal  Turbinates - Right: Normal, Left: Normal.  Hypertrophic vessels on the left.  Bilateral intranasal scabbing   Procedures:  Control Epistaxis:  Pre-Procedure Care: Consent was obtained. Procedure/Risks were  explained. Questions were answered. Correct patient identified. Correct side and site confirmed.   Control of a simple anterior nosebleed was performed. Location of the bleed was Kiesselbach's plexus. The procedure was performed on the left side.  Bleeding site/vessels were treated with AgNO3 cauterization.  Anesthesia used was topical Lidocaine/epinephrine 4%/1:35370   Patient tolerated the procedure well. Discharge instructions were discussed with the patient/parent. Epistaxis precautions were explained and understood. Use Vaseline and/or nasal saline gel to the affected area TID.       Current Outpatient Medications:     mupirocin 2 % External Ointment, Apply 1 Application topically 3 (three) times daily., Disp: 1 each, Rfl: 0    Omega-3 Fatty Acids (FISH OIL) 1200 MG Oral Cap, Take 3,600 mg by mouth daily., Disp: 1 capsule, Rfl: 0  ASSESSMENT AND PLAN    1. Nosebleed  Left-sided septal vessels cauterized without difficulty.  Use mupirocin 3 times daily epistaxis precautions and management of acute nosebleeds discussed and understood.  Return to see me as needed or in 1 month        This note was prepared using Dragon Medical voice recognition dictation software. As a result errors may occur. When identified these errors have been corrected. While every attempt is made to correct errors during dictation discrepancies may still exist    Tyrone Kuo MD    6/20/2024    9:15 AM

## 2025-02-20 ENCOUNTER — OFFICE VISIT (OUTPATIENT)
Dept: INTERNAL MEDICINE CLINIC | Facility: CLINIC | Age: 50
End: 2025-02-20
Payer: COMMERCIAL

## 2025-02-20 VITALS
SYSTOLIC BLOOD PRESSURE: 110 MMHG | RESPIRATION RATE: 16 BRPM | HEIGHT: 68 IN | OXYGEN SATURATION: 98 % | HEART RATE: 72 BPM | TEMPERATURE: 98 F | WEIGHT: 165 LBS | DIASTOLIC BLOOD PRESSURE: 64 MMHG | BODY MASS INDEX: 25.01 KG/M2

## 2025-02-20 DIAGNOSIS — Z00.00 ROUTINE HEALTH MAINTENANCE: Primary | ICD-10-CM

## 2025-02-20 DIAGNOSIS — E55.9 VITAMIN D DEFICIENCY: ICD-10-CM

## 2025-02-20 NOTE — PROGRESS NOTES
Georgi Mai is a 49 year old male  Chief Complaint   Patient presents with    Physical     ANNUAL PHYSICAL       HPI:     Georgi Mai is a 49 year old male who presents for a complete physical exam.       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      Wt Readings from Last 6 Encounters:   02/20/25 165 lb (74.8 kg)   02/20/24 166 lb (75.3 kg)   02/15/24 166 lb (75.3 kg)   01/16/24 160 lb (72.6 kg)   12/01/22 164 lb (74.4 kg)   11/18/21 168 lb (76.2 kg)     Body mass index is 25.09 kg/m².       Current Outpatient Medications   Medication Sig Dispense Refill    Cholecalciferol (VITAMIN D3) 25 MCG (1000 UT) Oral Cap Take 1 tablet by mouth daily.      mupirocin 2 % External Ointment Apply 1 Application topically 3 (three) times daily. 1 each 0    Omega-3 Fatty Acids (FISH OIL) 1200 MG Oral Cap Take 3,600 mg by mouth daily. 1 capsule 0      Past Medical History:    Epistaxis    Left nare 6/2024, Right nare 1/2024    Hypertriglyceridemia      Past Surgical History:   Procedure Laterality Date    Other surgical history  11/2014    Nasal Cauterization      Family History   Problem Relation Age of Onset    Gastro-Intestinal Disorder Mother         chrons disease    Cancer Paternal Grandmother     Diabetes Paternal Grandfather       Social History:  Social History     Socioeconomic History    Marital status:    Tobacco Use    Smoking status: Never    Smokeless tobacco: Never   Vaping Use    Vaping status: Never Used   Substance and Sexual Activity    Alcohol use: Not Currently     Comment: 3-6 drinks/year    Drug use: No   Other Topics Concern    Caffeine Concern No     Comment: 32 oz of soda           REVIEW OF SYSTEMS:   GENERAL: feels well otherwise  EYES:denies blurred vision or double vision  HEENT: denies nasal congestion, sinus pain or ST  LUNGS: denies shortness of breath or cough  CARDIOVASCULAR: denies chest pain or  pressure or palpitations  GI: denies abdominal pain, N/V, diarrhea, constipation, hematochezia or melena  : denies nocturia or changes in stream  NEURO: denies headaches or dizziness      EXAM:   /64   Pulse 72   Temp 98.3 °F (36.8 °C) (Oral)   Resp 16   Ht 5' 8\" (1.727 m)   Wt 165 lb (74.8 kg)   SpO2 98%   BMI 25.09 kg/m²   GENERAL: well developed, well nourished, in no apparent distress  HEENT: normal oropharynx, bilateral impacted cerumen   EYES: PERRLA, EOMI, conjunctivae pink  NECK: supple, no cervical or supraclavicular lymphadenopathy, no carotid bruits  LUNGS: clear to auscultation  CARDIO: RRR, normal S1S2, no gallops or murmurs  GI: soft, NT, ND, NABS, no HSM  EXTREMITIES: no edema, +2 PT pulses      ASSESSMENT AND PLAN:     Routine health maintenance  Cont exercise   Tdap  2/15/24  Cologuard negative 3/1/24; next in 3/2027  Check labs    Vitamin D deficiency  Takes 4000 units/day.  Check level.    Bilateral impacted cerumen, large amount  Easily removed with curette b/l  TM's normal         RTC 1 yr.    Rochelle Winkler MD, 02/15/24, 5:44 PM

## 2025-03-16 LAB
ABSOLUTE BASOPHILS: 20 CELLS/UL (ref 0–200)
ABSOLUTE EOSINOPHILS: 59 CELLS/UL (ref 15–500)
ABSOLUTE LYMPHOCYTES: 1622 CELLS/UL (ref 850–3900)
ABSOLUTE MONOCYTES: 397 CELLS/UL (ref 200–950)
ABSOLUTE NEUTROPHILS: 2803 CELLS/UL (ref 1500–7800)
ALBUMIN/GLOBULIN RATIO: 1.9 (CALC) (ref 1–2.5)
ALBUMIN: 4.3 G/DL (ref 3.6–5.1)
ALKALINE PHOSPHATASE: 55 U/L (ref 36–130)
ALT: 13 U/L (ref 9–46)
AST: 14 U/L (ref 10–40)
BASOPHILS: 0.4 %
BILIRUBIN, TOTAL: 1 MG/DL (ref 0.2–1.2)
BUN: 10 MG/DL (ref 7–25)
CALCIUM: 9 MG/DL (ref 8.6–10.3)
CARBON DIOXIDE: 28 MMOL/L (ref 20–32)
CHLORIDE: 106 MMOL/L (ref 98–110)
CHOL/HDLC RATIO: 3.8 (CALC)
CHOLESTEROL, TOTAL: 121 MG/DL
CREATININE: 0.76 MG/DL (ref 0.6–1.29)
EGFR: 112 ML/MIN/1.73M2
EOSINOPHILS: 1.2 %
GLOBULIN: 2.3 G/DL (CALC) (ref 1.9–3.7)
GLUCOSE: 101 MG/DL (ref 65–99)
HDL CHOLESTEROL: 32 MG/DL
HEMATOCRIT: 44.4 % (ref 38.5–50)
HEMOGLOBIN: 15.2 G/DL (ref 13.2–17.1)
LDL-CHOLESTEROL: 67 MG/DL (CALC)
LYMPHOCYTES: 33.1 %
MCH: 31.8 PG (ref 27–33)
MCHC: 34.2 G/DL (ref 32–36)
MCV: 92.9 FL (ref 80–100)
MONOCYTES: 8.1 %
MPV: 10.3 FL (ref 7.5–12.5)
NEUTROPHILS: 57.2 %
NON-HDL CHOLESTEROL: 89 MG/DL (CALC)
PLATELET COUNT: 154 THOUSAND/UL (ref 140–400)
POTASSIUM: 4.2 MMOL/L (ref 3.5–5.3)
PROTEIN, TOTAL: 6.6 G/DL (ref 6.1–8.1)
PSA, TOTAL: 0.81 NG/ML
RDW: 13 % (ref 11–15)
RED BLOOD CELL COUNT: 4.78 MILLION/UL (ref 4.2–5.8)
SODIUM: 142 MMOL/L (ref 135–146)
TRIGLYCERIDES: 137 MG/DL
TSH: 1.57 MIU/L (ref 0.4–4.5)
VITAMIN D, 25-OH, TOTAL: 37 NG/ML (ref 30–100)
WHITE BLOOD CELL COUNT: 4.9 THOUSAND/UL (ref 3.8–10.8)

## (undated) NOTE — MR AVS SNAPSHOT
TREY Altura  [facility] [address] [address]  [phone]               Thank you for choosing us for your health care visit with Malika Sheffield MD.  We are glad to serve you and happy to provide you with this summary of your visit.   [employer] Information     Healthy Diet and Regular Exercise  [employer] [facility] [address] for making healthy food choices  -   Enjoy your food, but eat less. Fully enjoy your food when eating. Don’t eat while distracted and slow down.    Avoid

## (undated) NOTE — ED AVS SNAPSHOT
Dinesh Naidu   MRN: [id number]    Department:  [facility] Emergency Department   Date of Visit:  9/27/2019           Disclosure     Insurance plans vary and the physician(s) referred by the ER may not be covered by your plan.  Please conta CARE PHYSICIAN AT ONCE OR RETURN IMMEDIATELY TO THE EMERGENCY DEPARTMENT. If you have been prescribed any medication(s), please fill your prescription right away and begin taking the medication(s) as directed.   If you believe that any of the medications